# Patient Record
Sex: MALE | Race: WHITE | Employment: UNEMPLOYED | URBAN - METROPOLITAN AREA
[De-identification: names, ages, dates, MRNs, and addresses within clinical notes are randomized per-mention and may not be internally consistent; named-entity substitution may affect disease eponyms.]

---

## 2019-07-23 ENCOUNTER — HOSPITAL ENCOUNTER (EMERGENCY)
Age: 39
Discharge: HOME | End: 2019-07-23
Payer: COMMERCIAL

## 2019-07-23 VITALS
WEIGHT: 128.75 LBS | OXYGEN SATURATION: 99 % | HEIGHT: 69 IN | BODY MASS INDEX: 19.07 KG/M2 | DIASTOLIC BLOOD PRESSURE: 69 MMHG | SYSTOLIC BLOOD PRESSURE: 108 MMHG | HEART RATE: 58 BPM | RESPIRATION RATE: 16 BRPM | TEMPERATURE: 98.2 F

## 2019-07-23 DIAGNOSIS — S29.019A THORACIC MYOFASCIAL STRAIN, INITIAL ENCOUNTER: Primary | ICD-10-CM

## 2019-07-23 PROCEDURE — 6370000000 HC RX 637 (ALT 250 FOR IP): Performed by: NURSE PRACTITIONER

## 2019-07-23 PROCEDURE — 99282 EMERGENCY DEPT VISIT SF MDM: CPT

## 2019-07-23 RX ORDER — METHOCARBAMOL 750 MG/1
750 TABLET, FILM COATED ORAL ONCE
Status: COMPLETED | OUTPATIENT
Start: 2019-07-23 | End: 2019-07-23

## 2019-07-23 RX ORDER — METHOCARBAMOL 500 MG/1
500 TABLET, FILM COATED ORAL 4 TIMES DAILY
Qty: 40 TABLET | Refills: 0 | Status: SHIPPED | OUTPATIENT
Start: 2019-07-23 | End: 2019-08-02

## 2019-07-23 RX ORDER — ACETAMINOPHEN 500 MG
1000 TABLET ORAL ONCE
Status: COMPLETED | OUTPATIENT
Start: 2019-07-23 | End: 2019-07-23

## 2019-07-23 RX ORDER — ACETAMINOPHEN 500 MG
1000 TABLET ORAL 3 TIMES DAILY PRN
Qty: 30 TABLET | Refills: 1 | Status: SHIPPED | OUTPATIENT
Start: 2019-07-23

## 2019-07-23 RX ADMIN — ACETAMINOPHEN 1000 MG: 500 TABLET ORAL at 19:56

## 2019-07-23 RX ADMIN — METHOCARBAMOL TABLETS 750 MG: 750 TABLET, COATED ORAL at 19:56

## 2019-07-23 ASSESSMENT — ENCOUNTER SYMPTOMS
GASTROINTESTINAL NEGATIVE: 1
RESPIRATORY NEGATIVE: 1
BACK PAIN: 1

## 2019-07-23 ASSESSMENT — PAIN DESCRIPTION - ONSET: ONSET: GRADUAL

## 2019-07-23 ASSESSMENT — PAIN DESCRIPTION - ORIENTATION
ORIENTATION: MID;LEFT
ORIENTATION: LEFT

## 2019-07-23 ASSESSMENT — PAIN DESCRIPTION - PROGRESSION: CLINICAL_PROGRESSION: GRADUALLY WORSENING

## 2019-07-23 ASSESSMENT — PAIN - FUNCTIONAL ASSESSMENT
PAIN_FUNCTIONAL_ASSESSMENT: PREVENTS OR INTERFERES SOME ACTIVE ACTIVITIES AND ADLS
PAIN_FUNCTIONAL_ASSESSMENT: PREVENTS OR INTERFERES SOME ACTIVE ACTIVITIES AND ADLS

## 2019-07-23 ASSESSMENT — PAIN DESCRIPTION - DESCRIPTORS: DESCRIPTORS: DULL;ACHING

## 2019-07-23 ASSESSMENT — PAIN SCALES - GENERAL
PAINLEVEL_OUTOF10: 8
PAINLEVEL_OUTOF10: 7
PAINLEVEL_OUTOF10: 8
PAINLEVEL_OUTOF10: 8

## 2019-07-23 ASSESSMENT — PAIN DESCRIPTION - LOCATION
LOCATION: BACK
LOCATION: BACK;GROIN

## 2019-07-23 ASSESSMENT — PAIN DESCRIPTION - FREQUENCY: FREQUENCY: CONTINUOUS

## 2019-07-23 NOTE — ED TRIAGE NOTES
Presents to ED complaining of 2 day history of left sided mid-back pain. Pain is worse with deep breathing or bending. Says pain is a constant dull aching with sharp pains that comes with movement or deep breathing, coughing or laughing.

## 2021-03-04 ENCOUNTER — APPOINTMENT (OUTPATIENT)
Dept: GENERAL RADIOLOGY | Age: 41
End: 2021-03-04
Payer: COMMERCIAL

## 2021-03-04 ENCOUNTER — HOSPITAL ENCOUNTER (EMERGENCY)
Age: 41
Discharge: HOME | End: 2021-03-04
Attending: EMERGENCY MEDICINE
Payer: COMMERCIAL

## 2021-03-04 VITALS
SYSTOLIC BLOOD PRESSURE: 110 MMHG | HEART RATE: 65 BPM | HEIGHT: 69 IN | DIASTOLIC BLOOD PRESSURE: 63 MMHG | TEMPERATURE: 98 F | RESPIRATION RATE: 14 BRPM | BODY MASS INDEX: 19.92 KG/M2 | WEIGHT: 134.48 LBS

## 2021-03-04 DIAGNOSIS — M79.601 RIGHT ARM PAIN: Primary | ICD-10-CM

## 2021-03-04 PROCEDURE — 99284 EMERGENCY DEPT VISIT MOD MDM: CPT

## 2021-03-04 PROCEDURE — 73070 X-RAY EXAM OF ELBOW: CPT

## 2021-03-04 PROCEDURE — 73090 X-RAY EXAM OF FOREARM: CPT

## 2021-03-04 ASSESSMENT — PAIN DESCRIPTION - DESCRIPTORS: DESCRIPTORS: ACHING

## 2021-03-04 ASSESSMENT — PAIN DESCRIPTION - FREQUENCY: FREQUENCY: CONTINUOUS

## 2021-03-04 ASSESSMENT — PAIN DESCRIPTION - ORIENTATION: ORIENTATION: RIGHT

## 2021-03-04 NOTE — ED PROVIDER NOTES
Triage Chief Complaint:   Arm Pain (right arm pain )    [place]:  Raad Hoover is a 38 y.o. male that presents for evaluation of pain to right elbow and right proximal forearm sustained yesterday when he had a mechanical trip and fall. He also states there is little skin tear to the elbow from where he fell. Up-to-date with tetanus. No numbness no shoulder pain no humeral pain no pain to other extremities. Arrives alert awake oriented    ROS:  At least 9 systems reviewed and otherwise acutely negative except as in the [address] History reviewed. No pertinent past medical history. History reviewed. No pertinent surgical history. History reviewed. No pertinent family history.   Social History     Socioeconomic History    Marital status: Single     Spouse name: Not on file    Number of children: Not on file    Years of education: Not on file    Highest education level: Not on file   Occupational History    Not on file   Social Needs    Financial resource strain: Not on file    Food insecurity     Worry: Not on file     Inability: Not on file    Transportation needs     Medical: Not on file     Non-medical: Not on file   Tobacco Use    Smoking status: Current Every Day Smoker     Packs/day: 0.50     Types: Cigarettes    Smokeless tobacco: Never Used   Substance and Sexual Activity    Alcohol use: No    Drug use: No    Sexual activity: Yes     Partners: Female   Lifestyle    Physical activity     Days per week: Not on file     Minutes per session: Not on file    Stress: Not on file   Relationships    Social connections     Talks on phone: Not on file     Gets together: Not on file     Attends Nondenominational service: Not on file     Active member of club or organization: Not on file     Attends meetings of clubs or organizations: Not on file     Relationship status: Not on file    Intimate partner violence     Fear of current or ex partner: Not on file     Emotionally abused: Not on file XR RADIUS ULNA RIGHT (2 VIEWS) (Final result)  Result time 03/04/21 17:52:49  Final result by Socorro Gardner MD (03/04/21 17:52:49)                Impression:    No acute osseous abnormality. Narrative:    EXAMINATION:   3  XRAY VIEWS OF THE RIGHT ELBOW; 3 XRAY VIEWS OF THE RIGHT FOREARM     3/4/2021 5:31 pm     COMPARISON:   None. HISTORY:   ORDERING SYSTEM PROVIDED HISTORY: fall   TECHNOLOGIST PROVIDED HISTORY:   Reason for exam:->fall   Reason for Exam: Right arm pain   Acuity: Acute   Type of Exam: Initial   Mechanism of Injury: fall     FINDINGS:   There is no evidence of acute fracture or dislocation.  There is normal bone   mineralization.  There is normal alignment.  There is no joint effusion. There is no focal soft tissue swelling.                     XR ELBOW RIGHT (2 VIEWS) (Final result)  Result time 03/04/21 17:52:49  Final result by Socorro Gardner MD (03/04/21 17:52:49)                Impression:    No acute osseous abnormality. Narrative:    EXAMINATION:   3  XRAY VIEWS OF THE RIGHT ELBOW; 3 XRAY VIEWS OF THE RIGHT FOREARM     3/4/2021 5:31 pm     COMPARISON:   None. HISTORY:   ORDERING SYSTEM PROVIDED HISTORY: fall   TECHNOLOGIST PROVIDED HISTORY:   Reason for exam:->fall   Reason for Exam: Right arm pain   Acuity: Acute   Type of Exam: Initial   Mechanism of Injury: fall     FINDINGS:   There is no evidence of acute fracture or dislocation.  There is normal bone   mineralization.  There is normal alignment.  There is no joint effusion. There is no focal soft tissue swelling.                    EKG (if obtained): (All EKG's are interpreted by myself in the absence of a cardiologist)  Initial EKG on my interpretation shows n/a    MDM:  Differential diagnosis: Fracture, dislocation, ligament injury, tendon injury I clearly have explained today's imaging does not rule out missed/occult fractures, nor ligamentous and/or tendonous injury and therefore patient must follow-up with orthopedics as referred for re-evaluation and possible advanced and/or repeat imaging. Vira Libman Discharge with sling, weightbearing as tolerated, rice care and Ortho follow-up along with topical antibiotics for skin wound. Discussed results, diagnosis and plan with patient and/or family. Questions addressed. Disposition and follow-up agreed upon. Specific discharge instructions explained. The patient and/or family and I have discussed the diagnosis and risks, and we agree with discharging home to follow-up with their primary care, specialist or referral doctor. In the event that medications were prescribed the risk profile of these medications were detailed expressly. We also discussed returning to the Emergency Department immediately if new or worsening symptoms occur. We have discussed the symptoms which are most concerning that necessitate immediate return. Old records reviewed. Labs and imaging reviewed and results discussed with patient. .        Patient was given scripts for the following medications. I counseled patient how to take these medications. New Prescriptions    No medications on file         CRITICAL CARE TIME   Total Critical Care time was 0 minutes, excluding separately reportable procedures. There was a high probability of clinically significant/life threatening deterioration in the patient's condition which required my urgent intervention.       Clinical Impression:  Right arm injury  (Please note that portions of this note may have been completed with a voice recognition program. Efforts were made to edit the dictations but occasionally words are mis-transcribed.)    MD Anita Adrian MD  03/04/21 [id number]

## 2021-03-04 NOTE — ED NOTES
Pt states he fell down steps yesterday, he reports that he reports right elbow pain.  He reports increased pain when he moves his fingers, strong radial pulse     Claritza Calderon RN  03/04/21 1780 Jimmy Joyce RN  03/04/21 1755

## 2021-03-04 NOTE — ED NOTES
Pt d/c home with AVS no s.s of distress noted, script x 1 in hand.  He denies questions about sling and follow up     Cristian Ontiveros RN  03/04/21 [id number]

## 2024-01-14 ENCOUNTER — HOSPITAL ENCOUNTER (EMERGENCY)
Age: 44
Discharge: HOME | End: 2024-01-14
Payer: COMMERCIAL

## 2024-01-14 ENCOUNTER — APPOINTMENT (OUTPATIENT)
Dept: GENERAL RADIOLOGY | Age: 44
End: 2024-01-14
Payer: COMMERCIAL

## 2024-01-14 VITALS
OXYGEN SATURATION: 97 % | SYSTOLIC BLOOD PRESSURE: 98 MMHG | DIASTOLIC BLOOD PRESSURE: 66 MMHG | BODY MASS INDEX: 19.36 KG/M2 | HEART RATE: 56 BPM | WEIGHT: 130.73 LBS | RESPIRATION RATE: 20 BRPM | HEIGHT: 69 IN | TEMPERATURE: 98.2 F

## 2024-01-14 DIAGNOSIS — M72.2 PLANTAR FASCIITIS OF LEFT FOOT: Primary | ICD-10-CM

## 2024-01-14 PROCEDURE — 73630 X-RAY EXAM OF FOOT: CPT

## 2024-01-14 PROCEDURE — 99283 EMERGENCY DEPT VISIT LOW MDM: CPT

## 2024-01-14 RX ORDER — NAPROXEN 500 MG/1
500 TABLET ORAL 2 TIMES DAILY WITH MEALS
Qty: 20 TABLET | Refills: 0 | Status: SHIPPED | OUTPATIENT
Start: 2024-01-14

## 2024-01-14 NOTE — ED TRIAGE NOTES
Patient presents to ED complaining of left foot pain which started yesterday. Patient states notcied increased pain and swelling today. Denies known injury, denies similar hx. States he does a lot of outside work.    Patient resting on bed, respirations even and easy at this time. No obvious distress.

## 2024-01-14 NOTE — ED NOTES
Patient ambulatory from ED. AVS provided and discussed with patient. All questions answered. Patient verbalizes understanding of discharge instructions. Respirations even and easy. No obvious distress at this time.

## 2024-01-14 NOTE — ED PROVIDER NOTES
**ADVANCED PRACTICE PROVIDER, I HAVE EVALUATED THIS PATIENT**        [facility]  EMERGENCY DEPARTMENT ENCOUNTER      Pt Name: Thang Hanley  MRN:[id number]  Birthdate 1980  Date of evaluation: 1/14/2024  Provider: Nolan Dougherty PA-C  Note Started: 4:01 PM EST 1/14/24        Chief Complaint:    Chief Complaint   Patient presents with    Foot Pain     Patient presents to ED complaining of left foot pain which started yesterday. Patient states notcied increased pain and swelling today. Denies known injury, denies similar hx. States he does a lot of outside work.         Nursing Notes, Past Medical Hx, Past Surgical Hx, Social Hx, Allergies, and Family Hx were all reviewed and agreed with or any disagreements were addressed in the HPI.    HPI: (Location, Duration, Timing, Severity, Quality, Assoc Sx, Context, Modifying factors)    History From: Patient  Limitations to history : None    Social Determinants Significantly Affecting Health : None    Chief Complaint of left foot pain.  Patient stated he dropped a metal box on his left foot about a week ago.  He just noticed today in the shower that it was swollen.  He did put an Ace wrap on it been taken Aleve for pain.  Says sometimes he been feeling some numbness and tingling.  Denies knee pain, no calf pain, no ankle pain.  He is ambulatory.  No other complaints.    This is a  43 y.o. male who presents to the emergency room with the above complaint    PastMedical/Surgical History:  History reviewed. No pertinent past medical history.  History reviewed. No pertinent surgical history.    Medications:  Previous Medications    ACETAMINOPHEN (TYLENOL) 500 MG TABLET    Take 2 tablets by mouth 3 times daily as needed for Pain       Review of Systems:  (1 systems needed)  Review of Systems   Constitutional:  Negative for chills and fever.   HENT:  Negative for congestion and sore throat.    Eyes:  Negative for pain and visual disturbance.

## 2024-01-14 NOTE — DISCHARGE INSTRUCTIONS
Ice foot 2-3 times a day 20 to 30 minutes on  Follow-up with podiatrist Dr. Del Cid  Take prescribed medication as prescribed on for pain

## 2024-12-05 ENCOUNTER — OFFICE VISIT (OUTPATIENT)
Age: 44
End: 2024-12-05

## 2024-12-05 VITALS
HEART RATE: 79 BPM | SYSTOLIC BLOOD PRESSURE: 105 MMHG | OXYGEN SATURATION: 92 % | DIASTOLIC BLOOD PRESSURE: 71 MMHG | BODY MASS INDEX: 19.31 KG/M2 | TEMPERATURE: 97.7 F | HEIGHT: 69 IN

## 2024-12-05 DIAGNOSIS — S60.459A FOREIGN BODY OF FINGER: Primary | ICD-10-CM

## 2024-12-05 NOTE — PROGRESS NOTES
Thang Hanley (:  1980) is a 44 y.o. male,New patient, here for evaluation of the following chief complaint(s):  Foreign Body in Skin (Left 4th finger splinter x today)      Assessment & Plan :         Follow up in PRN days if symptoms persist or if symptoms worsen.       Subjective :  Patient presents today with complaints of splinter in left ring finger that started today from a piece of wood      History provided by:  Patient   used: No         44 y.o. male presents with splinter in left ring finger   Consented for procedure.       Vitals:    24 1450   BP: 105/71   Site: Right Upper Arm   Position: Sitting   Cuff Size: Large Adult   Pulse: 79   Temp: 97.7 °F (36.5 °C)   TempSrc: Oral   SpO2: 92%   Height: 1.753 m (5' 9\")       No results found for this visit on 24.      Objective   Physical Exam      FOREIGN BODY REMOVAL    Date/Time: 2024 3:19 PM    Performed by: Izaiah Seaman APRN - CNP  Authorized by: Izaiah Seaman APRN - CNP  Body area: mucosa  General location: upper extremity  Location details: left ring finger  Anesthesia: local infiltration    Anesthesia:  Local Anesthetic: lidocaine 1% without epinephrine  Anesthetic total: 1 mL    Sedation:  Patient sedated: no    Patient restrained: no  Tendon involvement: none  Complexity: simple  1 objects recovered.  Objects recovered: wood  Post-procedure assessment: foreign body removed  Patient tolerance: patient tolerated the procedure well with no immediate complications            An electronic signature was used to authenticate this note.    MIRA Boyd CNP

## 2024-12-05 NOTE — PATIENT INSTRUCTIONS
Thank you for allowing us to care for you today and we hope you feel better soon  Warm soapy soaks 3 times daily  Observe for S/S of infection